# Patient Record
Sex: FEMALE | Race: WHITE | Employment: OTHER | URBAN - METROPOLITAN AREA
[De-identification: names, ages, dates, MRNs, and addresses within clinical notes are randomized per-mention and may not be internally consistent; named-entity substitution may affect disease eponyms.]

---

## 2017-01-31 ENCOUNTER — HOSPITAL ENCOUNTER (OUTPATIENT)
Dept: MAMMOGRAPHY | Age: 72
Discharge: HOME | End: 2017-01-31
Attending: INTERNAL MEDICINE
Payer: MEDICARE

## 2017-01-31 ENCOUNTER — HOSPITAL ENCOUNTER (OUTPATIENT)
Dept: ULTRASOUND IMAGING | Age: 72
Discharge: HOME | End: 2017-01-31
Attending: INTERNAL MEDICINE
Payer: MEDICARE

## 2017-01-31 DIAGNOSIS — R39.15 URINARY URGENCY: ICD-10-CM

## 2017-01-31 DIAGNOSIS — N39.0 URINARY TRACT INFECTION WITHOUT HEMATURIA, SITE UNSPECIFIED: ICD-10-CM

## 2017-01-31 DIAGNOSIS — M48.061 SPINAL STENOSIS OF LUMBAR REGION: ICD-10-CM

## 2017-01-31 DIAGNOSIS — M85.9 DISORDER OF BONE DENSITY AND STRUCTURE, UNSPECIFIED: ICD-10-CM

## 2017-01-31 DIAGNOSIS — M85.80 OSTEOPENIA: ICD-10-CM

## 2017-01-31 PROCEDURE — 77080 DXA BONE DENSITY AXIAL: CPT

## 2017-01-31 PROCEDURE — 76770 US EXAM ABDO BACK WALL COMP: CPT

## 2017-02-01 NOTE — PROGRESS NOTES
Will discuss results with you at the following upcoming appointment:   2/8/2017   2:20 PM    Orlin Posada MD          [facility]     Osteopenia on bone density.

## 2017-02-28 PROBLEM — K43.9 VENTRAL HERNIA WITHOUT OBSTRUCTION OR GANGRENE: Status: ACTIVE | Noted: 2017-02-28

## 2017-03-14 RX ORDER — CEFAZOLIN SODIUM IN 0.9 % NACL 2 G/50 ML
2 INTRAVENOUS SOLUTION, PIGGYBACK (ML) INTRAVENOUS ONCE
Status: CANCELLED | OUTPATIENT
Start: 2017-04-07 | End: 2017-04-07

## 2017-03-31 ENCOUNTER — HOSPITAL ENCOUNTER (OUTPATIENT)
Dept: SURGERY | Age: 72
Discharge: HOME | End: 2017-03-31
Payer: MEDICARE

## 2017-03-31 VITALS
RESPIRATION RATE: 20 BRPM | HEART RATE: 72 BPM | DIASTOLIC BLOOD PRESSURE: 82 MMHG | WEIGHT: 184.13 LBS | SYSTOLIC BLOOD PRESSURE: 143 MMHG | OXYGEN SATURATION: 98 % | HEIGHT: 60 IN | TEMPERATURE: 97.6 F | BODY MASS INDEX: 36.15 KG/M2

## 2017-03-31 LAB
CREAT SERPL-MCNC: 0.72 MG/DL (ref 0.6–1)
HGB BLD-MCNC: 14 G/DL (ref 11.7–15.4)
POTASSIUM SERPL-SCNC: 4 MMOL/L (ref 3.5–5.1)

## 2017-03-31 PROCEDURE — 84132 ASSAY OF SERUM POTASSIUM: CPT | Performed by: ANESTHESIOLOGY

## 2017-03-31 PROCEDURE — 82565 ASSAY OF CREATININE: CPT | Performed by: ANESTHESIOLOGY

## 2017-03-31 PROCEDURE — 85018 HEMOGLOBIN: CPT | Performed by: ANESTHESIOLOGY

## 2017-03-31 NOTE — PERIOP NOTES
Patient verified name, , and surgery as listed in [facility]. TYPE  CASE:11  Orders per surgeon: were Received  Labs per surgeon:per anesthesia:   Labs per anesthesia protocol: hgb, potassium and creat levels drawn and results are in [facility]  EKG  :  EKG from 2016 placed on chart. Patient denies any chest pain or shortness of breath on exertion      Patient provided with handouts including guide to surgery , transfusions, pain management and hand hygiene for the family and community. Pt verbalizes understanding of all pre-op instructions . Instructed that family must be present in building at all times. Hibiclens and instructions given per hospital policy. Instructed patient to continue  previous medications as prescribed prior to surgery and  to take the following medications the day of surgery according to anesthesia guidelines : lipitor,nexium, synthroid, Claritin if needed and paxil       Original medication prescription bottles were not visualized during patient appointment. Continue all previous medications unless otherwise directed. Instructed patient to hold  the following medications prior to surgery: all vitamins and supplements 7 days dprior to surgery and all nsaids 5 days prior to surgery including motrin,advil, aleve or ibuprofen      Patient verbalized understanding of all instructions and provided all medical/health information to the best of their ability.

## 2017-04-06 ENCOUNTER — ANESTHESIA EVENT (OUTPATIENT)
Dept: SURGERY | Age: 72
End: 2017-04-06
Payer: MEDICARE

## 2017-04-07 ENCOUNTER — ANESTHESIA (OUTPATIENT)
Dept: SURGERY | Age: 72
End: 2017-04-07
Payer: MEDICARE

## 2017-04-07 ENCOUNTER — HOSPITAL ENCOUNTER (OUTPATIENT)
Age: 72
Setting detail: OUTPATIENT SURGERY
Discharge: HOME | End: 2017-04-07
Attending: SURGERY | Admitting: SURGERY
Payer: MEDICARE

## 2017-04-07 ENCOUNTER — SURGERY (OUTPATIENT)
Age: 72
End: 2017-04-07

## 2017-04-07 VITALS
OXYGEN SATURATION: 94 % | RESPIRATION RATE: 11 BRPM | BODY MASS INDEX: 36.38 KG/M2 | HEART RATE: 84 BPM | DIASTOLIC BLOOD PRESSURE: 78 MMHG | HEIGHT: 60 IN | WEIGHT: 185.31 LBS | SYSTOLIC BLOOD PRESSURE: 171 MMHG | TEMPERATURE: 98.6 F

## 2017-04-07 PROCEDURE — 77030035464 HC SUT VLOC NON ABS COVD -B: Performed by: SURGERY

## 2017-04-07 PROCEDURE — 77030021158 HC TRCR BLN GELPRT AMR -B: Performed by: SURGERY

## 2017-04-07 PROCEDURE — 77030010507 HC ADH SKN DERMBND J&J -B: Performed by: SURGERY

## 2017-04-07 PROCEDURE — 77030035045 HC TRCR ENDOSC VRSPRT BLDLSS COVD -B: Performed by: SURGERY

## 2017-04-07 PROCEDURE — 77030035277 HC OBTRTR BLDELSS DISP INTU -B: Performed by: SURGERY

## 2017-04-07 PROCEDURE — 77030008522 HC TBNG INSUF LAPRO STRY -B: Performed by: SURGERY

## 2017-04-07 PROCEDURE — 77030009851 HC PCH RTVR ENDOSC AMR -B: Performed by: SURGERY

## 2017-04-07 PROCEDURE — 76210000016 HC OR PH I REC 1 TO 1.5 HR: Performed by: SURGERY

## 2017-04-07 PROCEDURE — C1781 MESH (IMPLANTABLE): HCPCS | Performed by: SURGERY

## 2017-04-07 PROCEDURE — 74011250636 HC RX REV CODE- 250/636

## 2017-04-07 PROCEDURE — 77030008703 HC TU ET UNCUF COVD -A: Performed by: ANESTHESIOLOGY

## 2017-04-07 PROCEDURE — 77030031139 HC SUT VCRL2 J&J -A: Performed by: SURGERY

## 2017-04-07 PROCEDURE — 77030020782 HC GWN BAIR PAWS FLX 3M -B: Performed by: ANESTHESIOLOGY

## 2017-04-07 PROCEDURE — 77030022704 HC SUT VLOC COVD -B: Performed by: SURGERY

## 2017-04-07 PROCEDURE — 74011250637 HC RX REV CODE- 250/637: Performed by: ANESTHESIOLOGY

## 2017-04-07 PROCEDURE — 74011000250 HC RX REV CODE- 250

## 2017-04-07 PROCEDURE — 77030021678 HC GLIDESCP STAT DISP VERT -B: Performed by: ANESTHESIOLOGY

## 2017-04-07 PROCEDURE — 76010000876 HC OR TIME 2 TO 2.5HR INTENSV - TIER 2: Performed by: SURGERY

## 2017-04-07 PROCEDURE — 74011250636 HC RX REV CODE- 250/636: Performed by: ANESTHESIOLOGY

## 2017-04-07 PROCEDURE — 77030011640 HC PAD GRND REM COVD -A: Performed by: SURGERY

## 2017-04-07 PROCEDURE — 76060000035 HC ANESTHESIA 2 TO 2.5 HR: Performed by: SURGERY

## 2017-04-07 PROCEDURE — 77030008477 HC STYL SATN SLP COVD -A: Performed by: ANESTHESIOLOGY

## 2017-04-07 PROCEDURE — 77030002933 HC SUT MCRYL J&J -A: Performed by: SURGERY

## 2017-04-07 PROCEDURE — 76210000020 HC REC RM PH II FIRST 0.5 HR: Performed by: SURGERY

## 2017-04-07 PROCEDURE — 77030032490 HC SLV COMPR SCD KNE COVD -B: Performed by: SURGERY

## 2017-04-07 PROCEDURE — 77030034744 HC WRMR SCOPE DISP STRL ADLR -A: Performed by: SURGERY

## 2017-04-07 PROCEDURE — 77030019908 HC STETH ESOPH SIMS -A: Performed by: ANESTHESIOLOGY

## 2017-04-07 PROCEDURE — 77030016151 HC PROTCTR LNS DFOG COVD -B: Performed by: SURGERY

## 2017-04-07 PROCEDURE — 74011000250 HC RX REV CODE- 250: Performed by: SURGERY

## 2017-04-07 PROCEDURE — 77030002966 HC SUT PDS J&J -A: Performed by: SURGERY

## 2017-04-07 PROCEDURE — 74011250636 HC RX REV CODE- 250/636: Performed by: SURGERY

## 2017-04-07 PROCEDURE — 77030034849: Performed by: SURGERY

## 2017-04-07 DEVICE — IMPLANTABLE DEVICE: Type: IMPLANTABLE DEVICE | Site: ABDOMEN | Status: FUNCTIONAL

## 2017-04-07 RX ORDER — HYDROMORPHONE HYDROCHLORIDE 2 MG/ML
0.5 INJECTION, SOLUTION INTRAMUSCULAR; INTRAVENOUS; SUBCUTANEOUS
Status: DISCONTINUED | OUTPATIENT
Start: 2017-04-07 | End: 2017-04-07 | Stop reason: HOSPADM

## 2017-04-07 RX ORDER — GLYCOPYRROLATE 0.2 MG/ML
INJECTION INTRAMUSCULAR; INTRAVENOUS AS NEEDED
Status: DISCONTINUED | OUTPATIENT
Start: 2017-04-07 | End: 2017-04-07 | Stop reason: HOSPADM

## 2017-04-07 RX ORDER — CEFAZOLIN SODIUM IN 0.9 % NACL 2 G/50 ML
2 INTRAVENOUS SOLUTION, PIGGYBACK (ML) INTRAVENOUS ONCE
Status: COMPLETED | OUTPATIENT
Start: 2017-04-07 | End: 2017-04-07

## 2017-04-07 RX ORDER — SODIUM CHLORIDE 0.9 % (FLUSH) 0.9 %
5-10 SYRINGE (ML) INJECTION AS NEEDED
Status: DISCONTINUED | OUTPATIENT
Start: 2017-04-07 | End: 2017-04-07 | Stop reason: HOSPADM

## 2017-04-07 RX ORDER — FENTANYL CITRATE 50 UG/ML
INJECTION, SOLUTION INTRAMUSCULAR; INTRAVENOUS AS NEEDED
Status: DISCONTINUED | OUTPATIENT
Start: 2017-04-07 | End: 2017-04-07 | Stop reason: HOSPADM

## 2017-04-07 RX ORDER — HYDRALAZINE HYDROCHLORIDE 20 MG/ML
INJECTION INTRAMUSCULAR; INTRAVENOUS AS NEEDED
Status: DISCONTINUED | OUTPATIENT
Start: 2017-04-07 | End: 2017-04-07 | Stop reason: HOSPADM

## 2017-04-07 RX ORDER — SODIUM CHLORIDE, SODIUM LACTATE, POTASSIUM CHLORIDE, CALCIUM CHLORIDE 600; 310; 30; 20 MG/100ML; MG/100ML; MG/100ML; MG/100ML
75 INJECTION, SOLUTION INTRAVENOUS CONTINUOUS
Status: DISCONTINUED | OUTPATIENT
Start: 2017-04-07 | End: 2017-04-07 | Stop reason: HOSPADM

## 2017-04-07 RX ORDER — CEFAZOLIN SODIUM IN 0.9 % NACL 2 G/50 ML
2 INTRAVENOUS SOLUTION, PIGGYBACK (ML) INTRAVENOUS ONCE
Status: DISCONTINUED | OUTPATIENT
Start: 2017-04-07 | End: 2017-04-07 | Stop reason: SDUPTHER

## 2017-04-07 RX ORDER — NALOXONE HYDROCHLORIDE 0.4 MG/ML
0.2 INJECTION, SOLUTION INTRAMUSCULAR; INTRAVENOUS; SUBCUTANEOUS AS NEEDED
Status: DISCONTINUED | OUTPATIENT
Start: 2017-04-07 | End: 2017-04-07 | Stop reason: HOSPADM

## 2017-04-07 RX ORDER — LIDOCAINE HYDROCHLORIDE 10 MG/ML
0.1 INJECTION INFILTRATION; PERINEURAL AS NEEDED
Status: DISCONTINUED | OUTPATIENT
Start: 2017-04-07 | End: 2017-04-07 | Stop reason: HOSPADM

## 2017-04-07 RX ORDER — LIDOCAINE HYDROCHLORIDE 20 MG/ML
INJECTION, SOLUTION EPIDURAL; INFILTRATION; INTRACAUDAL; PERINEURAL AS NEEDED
Status: DISCONTINUED | OUTPATIENT
Start: 2017-04-07 | End: 2017-04-07 | Stop reason: HOSPADM

## 2017-04-07 RX ORDER — DEXAMETHASONE SODIUM PHOSPHATE 4 MG/ML
INJECTION, SOLUTION INTRA-ARTICULAR; INTRALESIONAL; INTRAMUSCULAR; INTRAVENOUS; SOFT TISSUE AS NEEDED
Status: DISCONTINUED | OUTPATIENT
Start: 2017-04-07 | End: 2017-04-07 | Stop reason: HOSPADM

## 2017-04-07 RX ORDER — MIDAZOLAM HYDROCHLORIDE 1 MG/ML
2 INJECTION, SOLUTION INTRAMUSCULAR; INTRAVENOUS
Status: DISCONTINUED | OUTPATIENT
Start: 2017-04-07 | End: 2017-04-07 | Stop reason: HOSPADM

## 2017-04-07 RX ORDER — ROCURONIUM BROMIDE 10 MG/ML
INJECTION, SOLUTION INTRAVENOUS AS NEEDED
Status: DISCONTINUED | OUTPATIENT
Start: 2017-04-07 | End: 2017-04-07 | Stop reason: HOSPADM

## 2017-04-07 RX ORDER — ONDANSETRON 2 MG/ML
INJECTION INTRAMUSCULAR; INTRAVENOUS AS NEEDED
Status: DISCONTINUED | OUTPATIENT
Start: 2017-04-07 | End: 2017-04-07 | Stop reason: HOSPADM

## 2017-04-07 RX ORDER — PROPOFOL 10 MG/ML
INJECTION, EMULSION INTRAVENOUS AS NEEDED
Status: DISCONTINUED | OUTPATIENT
Start: 2017-04-07 | End: 2017-04-07 | Stop reason: HOSPADM

## 2017-04-07 RX ORDER — HYDROMORPHONE HYDROCHLORIDE 2 MG/ML
INJECTION, SOLUTION INTRAMUSCULAR; INTRAVENOUS; SUBCUTANEOUS AS NEEDED
Status: DISCONTINUED | OUTPATIENT
Start: 2017-04-07 | End: 2017-04-07 | Stop reason: HOSPADM

## 2017-04-07 RX ORDER — OXYCODONE AND ACETAMINOPHEN 5; 325 MG/1; MG/1
1 TABLET ORAL AS NEEDED
Status: DISCONTINUED | OUTPATIENT
Start: 2017-04-07 | End: 2017-04-07 | Stop reason: HOSPADM

## 2017-04-07 RX ORDER — OXYCODONE AND ACETAMINOPHEN 7.5; 325 MG/1; MG/1
1 TABLET ORAL
Qty: 25 TAB | Refills: 0 | Status: SHIPPED | OUTPATIENT
Start: 2017-04-07 | End: 2017-04-27

## 2017-04-07 RX ORDER — FAMOTIDINE 20 MG/1
20 TABLET, FILM COATED ORAL ONCE
Status: COMPLETED | OUTPATIENT
Start: 2017-04-07 | End: 2017-04-07

## 2017-04-07 RX ORDER — NEOSTIGMINE METHYLSULFATE 1 MG/ML
INJECTION INTRAVENOUS AS NEEDED
Status: DISCONTINUED | OUTPATIENT
Start: 2017-04-07 | End: 2017-04-07 | Stop reason: HOSPADM

## 2017-04-07 RX ORDER — BUPIVACAINE HYDROCHLORIDE AND EPINEPHRINE 5; 5 MG/ML; UG/ML
INJECTION, SOLUTION EPIDURAL; INTRACAUDAL; PERINEURAL AS NEEDED
Status: DISCONTINUED | OUTPATIENT
Start: 2017-04-07 | End: 2017-04-07 | Stop reason: HOSPADM

## 2017-04-07 RX ADMIN — HYDROMORPHONE HYDROCHLORIDE 0.2 MG: 2 INJECTION, SOLUTION INTRAMUSCULAR; INTRAVENOUS; SUBCUTANEOUS at 10:18

## 2017-04-07 RX ADMIN — CEFAZOLIN 2 G: 1 INJECTION, POWDER, FOR SOLUTION INTRAMUSCULAR; INTRAVENOUS; PARENTERAL at 08:30

## 2017-04-07 RX ADMIN — NEOSTIGMINE METHYLSULFATE 3 MG: 1 INJECTION INTRAVENOUS at 10:07

## 2017-04-07 RX ADMIN — SODIUM CHLORIDE, SODIUM LACTATE, POTASSIUM CHLORIDE, AND CALCIUM CHLORIDE: 600; 310; 30; 20 INJECTION, SOLUTION INTRAVENOUS at 09:00

## 2017-04-07 RX ADMIN — HYDROMORPHONE HYDROCHLORIDE 0.2 MG: 2 INJECTION, SOLUTION INTRAMUSCULAR; INTRAVENOUS; SUBCUTANEOUS at 10:22

## 2017-04-07 RX ADMIN — OXYCODONE HYDROCHLORIDE AND ACETAMINOPHEN 1 TABLET: 5; 325 TABLET ORAL at 10:53

## 2017-04-07 RX ADMIN — FENTANYL CITRATE 25 MCG: 50 INJECTION, SOLUTION INTRAMUSCULAR; INTRAVENOUS at 09:53

## 2017-04-07 RX ADMIN — FENTANYL CITRATE 25 MCG: 50 INJECTION, SOLUTION INTRAMUSCULAR; INTRAVENOUS at 09:33

## 2017-04-07 RX ADMIN — ONDANSETRON 4 MG: 2 INJECTION INTRAMUSCULAR; INTRAVENOUS at 10:02

## 2017-04-07 RX ADMIN — BUPIVACAINE HYDROCHLORIDE AND EPINEPHRINE BITARTRATE 30 ML: 5; .005 INJECTION, SOLUTION EPIDURAL; INTRACAUDAL; PERINEURAL at 10:11

## 2017-04-07 RX ADMIN — HYDRALAZINE HYDROCHLORIDE 5 MG: 20 INJECTION INTRAMUSCULAR; INTRAVENOUS at 10:11

## 2017-04-07 RX ADMIN — SODIUM CHLORIDE, SODIUM LACTATE, POTASSIUM CHLORIDE, AND CALCIUM CHLORIDE 75 ML/HR: 600; 310; 30; 20 INJECTION, SOLUTION INTRAVENOUS at 07:50

## 2017-04-07 RX ADMIN — HYDRALAZINE HYDROCHLORIDE 5 MG: 20 INJECTION INTRAMUSCULAR; INTRAVENOUS at 10:16

## 2017-04-07 RX ADMIN — FAMOTIDINE 20 MG: 20 TABLET ORAL at 07:50

## 2017-04-07 RX ADMIN — PROPOFOL 200 MG: 10 INJECTION, EMULSION INTRAVENOUS at 08:24

## 2017-04-07 RX ADMIN — DEXAMETHASONE SODIUM PHOSPHATE 8 MG: 4 INJECTION, SOLUTION INTRA-ARTICULAR; INTRALESIONAL; INTRAMUSCULAR; INTRAVENOUS; SOFT TISSUE at 08:34

## 2017-04-07 RX ADMIN — LIDOCAINE HYDROCHLORIDE 70 MG: 20 INJECTION, SOLUTION EPIDURAL; INFILTRATION; INTRACAUDAL; PERINEURAL at 08:23

## 2017-04-07 RX ADMIN — FENTANYL CITRATE 50 MCG: 50 INJECTION, SOLUTION INTRAMUSCULAR; INTRAVENOUS at 09:04

## 2017-04-07 RX ADMIN — PROPOFOL 40 MG: 10 INJECTION, EMULSION INTRAVENOUS at 10:23

## 2017-04-07 RX ADMIN — GLYCOPYRROLATE 0.3 MG: 0.2 INJECTION INTRAMUSCULAR; INTRAVENOUS at 10:07

## 2017-04-07 RX ADMIN — HYDROMORPHONE HYDROCHLORIDE 0.4 MG: 2 INJECTION, SOLUTION INTRAMUSCULAR; INTRAVENOUS; SUBCUTANEOUS at 10:15

## 2017-04-07 RX ADMIN — FENTANYL CITRATE 100 MCG: 50 INJECTION, SOLUTION INTRAMUSCULAR; INTRAVENOUS at 08:23

## 2017-04-07 RX ADMIN — MIDAZOLAM HYDROCHLORIDE 2 MG: 1 INJECTION, SOLUTION INTRAMUSCULAR; INTRAVENOUS at 08:02

## 2017-04-07 RX ADMIN — ROCURONIUM BROMIDE 50 MG: 10 INJECTION, SOLUTION INTRAVENOUS at 08:24

## 2017-04-07 NOTE — ANESTHESIA POSTPROCEDURE EVALUATION
Post-Anesthesia Evaluation and Assessment    Patient: Amelia Hargrove MRN: [id number]  SSN: [id number]    YOB: 1945  Age: 73 y.o. Sex: female       Cardiovascular Function/Vital Signs  Visit Vitals    /78    Pulse 84    Temp 37 °C (98.6 °F)    Resp 11    Ht 5' (1.524 m)    Wt 84.1 kg (185 lb 5 oz)    SpO2 94%    BMI 36.19 kg/m2       Patient is status post general anesthesia for Procedure(s): HERNIA VENTRAL REPAIR ROBOTIC ASSISTED WITH MESH. Nausea/Vomiting: None    Postoperative hydration reviewed and adequate. Pain:  Pain Scale 1: Numeric (0 - 10) (04/07/17 1116)  Pain Intensity 1: 3 (04/07/17 1116)   Managed    Neurological Status:   Neuro (WDL): Within Defined Limits (04/07/17 1047)   At baseline    Mental Status and Level of Consciousness: Arousable    Pulmonary Status:   O2 Device: Room air (04/07/17 1106)   Adequate oxygenation and airway patent    Complications related to anesthesia: None    Post-anesthesia assessment completed.  No concerns    Signed By: Stuart Eddy MD     April 7, 2017

## 2017-04-07 NOTE — H&P
Surgery History and Physical    Subjective:      Isadora Alas is a 73 y.o. female who presents for a robotic assisted ventral hernia repair. Past Medical History:   Diagnosis Date    Anxiety 2016    Cancer [facility]) Dx     right breast cancer    Fatty liver 2016    GERD (gastroesophageal reflux disease)     H/O seasonal allergies     History of breast cancer 007    s/p bilateral mastectomies    Hyperlipidemia     Hypertension     managed with med    Osteoarthritis     knees and back    Spinal stenosis     Thyroid disease     hypo     Past Surgical History:   Procedure Laterality Date    HX  SECTION      HX GYN Bilateral     bilateral oopherectomy - uterus remains    HX KNEE REPLACEMENT Bilateral     , ,  - right on 2 occasions    HX MASTECTOMY Bilateral     and flap reconstruction- complications due to wound of the groin/lower abdomen due to pseudomonas    HX WRIST FRACTURE TX Right     hardware placed then removed      Family History   Problem Relation Age of Onset    Hypertension Mother     Hypertension Father     Diabetes Father     Stroke Father     Heart Disease Father      masive MI    Other Sister      thrombocytopenia    Seizures Sister     Ulcerative Colitis Sister      Social History   Substance Use Topics    Smoking status: Former Smoker     Packs/day: 0.75     Years: 20.00     Quit date: 2000    Smokeless tobacco: Never Used    Alcohol use Yes      Comment: occasionaly      Prior to Admission medications    Medication Sig Start Date End Date Taking? Authorizing Provider   omega-3 fatty acids-vitamin e 1,000 mg cap Take  by mouth. Yes Historical Provider   esomeprazole (NEXIUM) 40 mg capsule Take 1 Cap by mouth daily. 17  Yes Oliverio Dorman MD   atorvastatin (LIPITOR) 10 mg tablet Take 1 Tab by mouth daily. 17  Yes Oliverio Dorman MD   cholecalciferol, VITAMIN D3, (VITAMIN D3) 5,000 unit tab tablet Take  by mouth daily. Yes Historical Provider   loratadine (CLARITIN) 10 mg tablet Take 10 mg by mouth daily as needed. Yes Historical Provider   levothyroxine (SYNTHROID) 75 mcg tablet Take 1 Tab by mouth Daily (before breakfast). 16  Yes Shayan Myrick MD   lisinopril-hydroCHLOROthiazide (PRINZIDE, ZESTORETIC) 20-25 mg per tablet Take 1 Tab by mouth daily. 16  Yes Shayan Myrick MD   PARoxetine (PAXIL) 40 mg tablet Take 1 Tab by mouth daily. 16  Yes Shayan Myrick MD   [facility] cmb #3-fos-pantethine (PROBIOTIC AND ACIDOPHILUS) 300-250 million cell-mg cap Take daily to help with bowel health   Yes Shayan Myrick MD      No Known Allergies    Review of Systems   All other systems reviewed and are negative. Objective:     Patient Vitals for the past 8 hrs:   BP Temp Pulse Resp SpO2 Height Weight   17 0646 (!) 166/97 97.5 °F (36.4 °C) 60 18 96 % 5' (1.524 m) 185 lb 5 oz (84.1 kg)       Temp (24hrs), Av.5 °F (36.4 °C), Min:97.5 °F (36.4 °C), Max:97.5 °F (36.4 °C)      Physical Exam   Constitutional: She is oriented to person, place, and time. She appears well-developed and well-nourished. No distress. HENT:   Head: Normocephalic and atraumatic. Eyes: Pupils are equal, round, and reactive to light. Neck: Normal range of motion. Neck supple. Cardiovascular: Normal rate, regular rhythm and normal heart sounds. Pulmonary/Chest: Effort normal and breath sounds normal. No respiratory distress. She has no wheezes. Abdominal: Soft. Bowel sounds are normal.   +Ventral hernia   Musculoskeletal: Normal range of motion. Neurological: She is alert and oriented to person, place, and time. Skin: Skin is warm and dry. She is not diaphoretic. Psychiatric: Her behavior is normal. Judgment and thought content normal.       Assessment:     Ventral hernia    Plan:     Discussed the risk of surgery including but not limited to bleeding,infection,recurrence,  and the risks of general anesthetic.  The patient understands the risks; any and all questions were answered to the patient's satisfaction.     Signed By: Jori Huber MD     April 7, 2017

## 2017-04-07 NOTE — ANESTHESIA PREPROCEDURE EVALUATION
Anesthetic History   No history of anesthetic complications            Review of Systems / Medical History  Patient summary reviewed, nursing notes reviewed and pertinent labs reviewed    Pulmonary  Within defined limits                 Neuro/Psych   Within defined limits           Cardiovascular    Hypertension: well controlled          Hyperlipidemia    Exercise tolerance: >4 METS     GI/Hepatic/Renal     GERD: well controlled           Endo/Other      Hypothyroidism: well controlled  Arthritis     Other Findings              Physical Exam    Airway  Mallampati: II  TM Distance: 4 - 6 cm  Neck ROM: normal range of motion   Mouth opening: Normal     Cardiovascular    Rhythm: regular           Dental  No notable dental hx       Pulmonary  Breath sounds clear to auscultation               Abdominal  GI exam deferred       Other Findings            Anesthetic Plan    ASA: 2  Anesthesia type: general          Induction: Intravenous  Anesthetic plan and risks discussed with: Patient

## 2017-04-07 NOTE — OP NOTES
[facility] 67   OPERATIVE REPORT       Name:  Meka Carmona   MR#:  [id number]   :  1945   Account #:  [de-identified]   Date of Adm:  2017       DATE OF PROCEDURE: 2017     PREOPERATIVE DIAGNOSIS: Ventral hernia. POSTOPERATIVE DIAGNOSIS: Ventral hernia. PROCEDURE: Robotic ventral hernia repair with mesh. SURGEON: Mahogany Aaron. Selena Concepcion MD.    ANESTHESIA: General.    COMPLICATIONS: None. COUNTS: Correct. ESTIMATED BLOOD LOSS: Minimal.    SPECIMEN: None. PROCEDURE: After the patient was asleep, the abdomen was prepped   and draped in sterile fashion. A time-out was carried out and   all were in agreement. A left lateral mid quadrant incision made, dissection carried   down to the fascia, opened, and directed entry to the abdominal   cavity gained. A 10 mm camera port was then inserted, balloon   blown up, and then the robotic scope inserted. The patient had   about a 4 x 4 cm round ventral hernia superiorly. There were no   contents of this. At this point, a 12 mm was then placed 10 cm   inferiorly and slightly more toward the midline. In the right   upper quadrant, an 8 mm robotic trocar port placed 10 cm and   slightly more medial from the camera port. Robot was then   brought in and docked. I then grasped the sac and this was   dissected free from the defect completely and then stored in the   abdominal cavity inferiorly for removal later. I then used a #1   V-Loc permanent suture to close the defect in a running fashion. Once this was done, I used a 7.6 x 7.6 Ventrio mesh. This was   inserted. A needle was inserted through the abdominal wall   through the mid portion and through the mesh in order to put the   suture passer and this was used to pull this up and hold it in   place. This was tied down. This was made through a very small   nick in the skin with an 11 mm blade. This suture was then cut.    A running V-Loc was then utilized to secure this circumferentially around the entire mesh. At this point, all   sutures removed. Endobag placed and this peritoneal sac was then   placed in the EndoCatch bag and removed. All trocars removed,   pneumoperitoneum released, 4-0 subcuticular Monocryl and   Dermabond was used to close the 3 incisions. #1 PDS was used to   close the fascia at the 12 mm trocar site and the 10 mm trocar   site. The patient tolerated the procedure well. ALDO Alcaraz MD      [facility] / [address] [place] [address]   D:  04/07/2017   10:19   T:  04/07/2017   10:59   Job #:  [id number]

## 2017-04-07 NOTE — BRIEF OP NOTE
BRIEF OPERATIVE NOTE    Date of Procedure: 4/7/2017   Preoperative Diagnosis: Ventral hernia without obstruction or gangrene [K43.9]  Postoperative Diagnosis: Ventral hernia without obstruction or gangrene [K43.9]    Procedure(s): HERNIA VENTRAL REPAIR ROBOTIC ASSISTED WITH MESH  Surgeon(s) and Role: Yanely Collado MD - Primary            Surgical Staff:  Circ-1: Jessica Sepulveda RN  Scrub Tech-1: Pia Yuan  Scrub Tech-2: Rajesh Lei  Event Time In   Incision Start 4313   Incision Close      Anesthesia: General   Estimated Blood Loss: minimal  Specimens: * No specimens in log *   Findings: see op note   Complications: none  Implants:   Implant Name Type Inv.  Item Serial No.  Lot No. LRB No. Used Action   MESH VENTRIO SM CIRC WI AB RG --  - [id number]   MESH VENTRIO SM CIRC WI AB RG --    BARD DASHAWN [id number] N/A 1 Implanted

## 2017-04-07 NOTE — IP AVS SNAPSHOT
Current Discharge Medication List  
  
START taking these medications Dose & Instructions Dispensing Information Comments Morning Noon Evening Bedtime  
 oxyCODONE-acetaminophen 7.5-325 mg per tablet Commonly known as:  PERCOCET Your last dose was: Your next dose is:    
   
   
 Dose:  1 Tab Take 1 Tab by mouth every four (4) hours as needed for Pain. Max Daily Amount: 6 Tabs. Quantity:  25 Tab Refills:  0 CONTINUE these medications which have NOT CHANGED Dose & Instructions Dispensing Information Comments Morning Noon Evening Bedtime  
 atorvastatin 10 mg tablet Commonly known as:  LIPITOR Your last dose was: Your next dose is:    
   
   
 Dose:  10 mg Take 1 Tab by mouth daily. Quantity:  90 Tab Refills:  1 cholecalciferol (VITAMIN D3) 5,000 unit Tab tablet Commonly known as:  VITAMIN D3 Your last dose was: Your next dose is: Take  by mouth daily. Refills:  0  
     
   
   
   
  
 esomeprazole 40 mg capsule Commonly known as:  NexIUM Your last dose was: Your next dose is:    
   
   
 Dose:  40 mg Take 1 Cap by mouth daily. Quantity:  90 Cap Refills:  1  
     
   
   
   
  
 levothyroxine 75 mcg tablet Commonly known as:  SYNTHROID Your last dose was: Your next dose is:    
   
   
 Dose:  75 mcg Take 1 Tab by mouth Daily (before breakfast). Quantity:  90 Tab Refills:  1  
     
   
   
   
  
 lisinopril-hydroCHLOROthiazide 20-25 mg per tablet Commonly known as:  Eligio Flurry Your last dose was: Your next dose is:    
   
   
 Dose:  1 Tab Take 1 Tab by mouth daily. Quantity:  90 Tab Refills:  1  
     
   
   
   
  
 loratadine 10 mg tablet Commonly known as:  Fidel Dario Your last dose was:     
   
Your next dose is:    
   
   
 Dose:  10 mg  
 Take 10 mg by mouth daily as needed. Refills:  0  
     
   
   
   
  
 omega-3 fatty acids-vitamin e 1,000 mg Cap Your last dose was: Your next dose is: Take  by mouth. Refills:  0 PARoxetine 40 mg tablet Commonly known as:  PAXIL Your last dose was: Your next dose is:    
   
   
 Dose:  40 mg Take 1 Tab by mouth daily. Quantity:  90 Tab Refills:  1 PROBIOTIC AND ACIDOPHILUS 300-250 million cell-mg Cap Generic drug:  lactobac cmb #3-fos-pantethine Your last dose was: Your next dose is: Take daily to help with bowel health Refills:  0 Where to Get Your Medications Information on where to get these meds will be given to you by the nurse or doctor. ! Ask your nurse or doctor about these medications  
  oxyCODONE-acetaminophen 7.5-325 mg per tablet

## 2017-04-07 NOTE — IP AVS SNAPSHOT
[address] [address] 
[phone] Patient: Chacorta Bain MRN: [id number] :1945 You are allergic to the following No active allergies Recent Documentation Height Weight BMI OB Status Smoking Status 1.524 m 84.1 kg 36.19 kg/m2 Postmenopausal Former Smoker Emergency Contacts Name Discharge Info Relation Home Work Mobile [facility] DISCHARGE CAREGIVER [3] Other Relative [6] [phone] About your hospitalization You were admitted on:  2017 You last received care in the:  [facility] PACU You were discharged on:  2017 Unit phone number:  [phone] Why you were hospitalized Your primary diagnosis was:  Not on File Providers Seen During Your Hospitalizations Provider Role Specialty Primary office phone Rowan Kuo MD Attending Provider General Surgery [phone] Your Primary Care Physician (PCP) Primary Care Physician Office Phone Office Fax Paolo Talbert [phone] Follow-up Information Follow up With Details Comments Contact Info Kash Pope MD   [address] [place] [address],[address] 
[phone] Rowan Kuo MD Follow up in 10 day(s) follow up [address] 
[place] [address] [address] Surgical [place] [address] 
[phone] Your Appointments 2017  1:15 PM EDT Global Post Op with Rowan Kuo MD  
[place] [facility] ([facility] MAIN) [address] [place] [address]  
[phone] Thursday May 04, 2017 10:00 AM EDT  
LAB with [facility] [address] [facility]) [facility] [address] [phone] Tuesday May 16, 2017 10:30 AM EDT Medicare Wellness with Kash Pope MD, [facility] Uri Styles [address] [place] [facility] [address] [address] [phone] Current Discharge Medication List  
  
START taking these medications Dose & Instructions Dispensing Information Comments Morning Noon Evening Bedtime  
 oxyCODONE-acetaminophen 7.5-325 mg per tablet Commonly known as:  PERCOCET Your last dose was: Your next dose is:    
   
   
 Dose:  1 Tab Take 1 Tab by mouth every four (4) hours as needed for Pain. Max Daily Amount: 6 Tabs. Quantity:  25 Tab Refills:  0 CONTINUE these medications which have NOT CHANGED Dose & Instructions Dispensing Information Comments Morning Noon Evening Bedtime  
 atorvastatin 10 mg tablet Commonly known as:  LIPITOR Your last dose was: Your next dose is:    
   
   
 Dose:  10 mg Take 1 Tab by mouth daily. Quantity:  90 Tab Refills:  1 cholecalciferol (VITAMIN D3) 5,000 unit Tab tablet Commonly known as:  VITAMIN D3 Your last dose was: Your next dose is: Take  by mouth daily. Refills:  0  
     
   
   
   
  
 esomeprazole 40 mg capsule Commonly known as:  NexIUM Your last dose was: Your next dose is:    
   
   
 Dose:  40 mg Take 1 Cap by mouth daily. Quantity:  90 Cap Refills:  1  
     
   
   
   
  
 levothyroxine 75 mcg tablet Commonly known as:  SYNTHROID Your last dose was: Your next dose is:    
   
   
 Dose:  75 mcg Take 1 Tab by mouth Daily (before breakfast). Quantity:  90 Tab Refills:  1  
     
   
   
   
  
 lisinopril-hydroCHLOROthiazide 20-25 mg per tablet Commonly known as:  Yoly Nation Your last dose was: Your next dose is:    
   
   
 Dose:  1 Tab Take 1 Tab by mouth daily. Quantity:  90 Tab Refills:  1  
     
   
   
   
  
 loratadine 10 mg tablet Commonly known as:  Rahel Silva Your last dose was: Your next dose is:    
   
   
 Dose:  10 mg Take 10 mg by mouth daily as needed. Refills:  0  
     
   
   
   
  
 omega-3 fatty acids-vitamin e 1,000 mg Cap Your last dose was: Your next dose is: Take  by mouth. Refills:  0 PARoxetine 40 mg tablet Commonly known as:  PAXIL Your last dose was: Your next dose is:    
   
   
 Dose:  40 mg Take 1 Tab by mouth daily. Quantity:  90 Tab Refills:  1 PROBIOTIC AND ACIDOPHILUS 300-250 million cell-mg Cap Generic drug:  lactobac cmb #3-fos-pantethine Your last dose was: Your next dose is: Take daily to help with bowel health Refills:  0 Where to Get Your Medications Information on where to get these meds will be given to you by the nurse or doctor. ! Ask your nurse or doctor about these medications  
  oxyCODONE-acetaminophen 7.5-325 mg per tablet Discharge Instructions HERNIA REPAIR 
APPOINTMENT-4/19/2017 AT 1:15 WITH DR. RUIZ 
ACTIVITY · As tolerated and as directed by your doctor. · Bathe and shower as directed by your doctor. · Avoid lifting more than 15 pounds (pulling and straining). Avoid excessive use of stairs. · Take deep breathes and support incision with pillow when you cough. DIET · Clear liquids until no nausea or vomiting; then light diet for the first day. · Advance to regular diet on second day, unless directed by your doctor. · If nausea or vomiting continues, call your doctor. DRESSING CARE-keep dry CALL YOUR DOCTOR IF  
· Excessive bleeding that does not stop after holding pressure over the area. · Temperature of 101 degrees F or above. · Redness, excessive swelling or bruising, and/ or green or yellow, smelly discharge from incision. AFTER ANESTHESIA · For the first 24 hours: DO NOT drive, drink alcoholic beverages, or make important decisions. · Be aware of dizziness following anesthesia and while taking pain medicatio YOUR DOCTOR'S PHONE NUMBER [phone] DISCHARGE SUMMARY from Nurse PATIENT INSTRUCTIONS: 
 
After general anesthesia or intravenous sedation, for 24 hours or while taking prescription Narcotics: · Limit your activities · Do not drive and operate hazardous machinery · Do not make important personal or business decisions · Do  not drink alcoholic beverages · If you have not urinated within 8 hours after discharge, please contact your surgeon on call. *  Please give a list of your current medications to your Primary Care Provider. *  Please update this list whenever your medications are discontinued, doses are 
    changed, or new medications (including over-the-counter products) are added. *  Please carry medication information at all times in case of emergency situations. These are general instructions for a healthy lifestyle: No smoking/ No tobacco products/ Avoid exposure to second hand smoke Surgeon General's Warning:  Quitting smoking now greatly reduces serious risk to your health. Obesity, smoking, and sedentary lifestyle greatly increases your risk for illness A healthy diet, regular physical exercise & weight monitoring are important for maintaining a healthy lifestyle You may be retaining fluid if you have a history of heart failure or if you experience any of the following symptoms:  Weight gain of 3 pounds or more overnight or 5 pounds in a week, increased swelling in our hands or feet or shortness of breath while lying flat in bed. Please call your doctor as soon as you notice any of these symptoms; do not wait until your next office visit. Recognize signs and symptoms of STROKE: 
 
F-face looks uneven A-arms unable to move or move unevenly S-speech slurred or non-existent T-time-call 911 as soon as signs and symptoms begin-DO NOT go Back to bed or wait to see if you get better-TIME IS BRAIN. Discharge Orders None Introducing [facility]! Dear Carley Forrester: Thank you for requesting a [employer] account. Our records indicate that you already have an active [employer] account. You can access your account anytime at [email or web address] Did you know that you can access your hospital and ER discharge instructions at any time in [employer]? You can also review all of your test results from your hospital stay or ER visit. Additional Information If you have questions, please visit the Frequently Asked Questions section of the [employer] website at [email or web address] Remember, [employer] is NOT to be used for urgent needs. For medical emergencies, dial 911. Now available from your iPhone and Android! General Information Please provide this summary of care documentation to your next provider. Patient Signature:  ____________________________________________________________ Date:  ____________________________________________________________  
  
Skip Cons Provider Signature:  ____________________________________________________________ Date:  ____________________________________________________________

## 2017-04-07 NOTE — DISCHARGE INSTRUCTIONS
HERNIA REPAIR  APPOINTMENT-4/19/2017 AT 1:15 WITH DR. RUIZ  ACTIVITY  · As tolerated and as directed by your doctor. · Bathe and shower as directed by your doctor. · Avoid lifting more than 15 pounds (pulling and straining). Avoid excessive use of stairs. · Take deep breathes and support incision with pillow when you cough. DIET  · Clear liquids until no nausea or vomiting; then light diet for the first day. · Advance to regular diet on second day, unless directed by your doctor. · If nausea or vomiting continues, call your doctor. DRESSING CARE-keep dry    CALL YOUR DOCTOR IF   · Excessive bleeding that does not stop after holding pressure over the area. · Temperature of 101 degrees F or above. · Redness, excessive swelling or bruising, and/ or green or yellow, smelly discharge from incision. AFTER ANESTHESIA  · For the first 24 hours: DO NOT drive, drink alcoholic beverages, or make important decisions. · Be aware of dizziness following anesthesia and while taking pain medicatio    YOUR DOCTOR'S PHONE NUMBER [phone]    DISCHARGE SUMMARY from Nurse    PATIENT INSTRUCTIONS:    After general anesthesia or intravenous sedation, for 24 hours or while taking prescription Narcotics:  · Limit your activities  · Do not drive and operate hazardous machinery  · Do not make important personal or business decisions  · Do  not drink alcoholic beverages  · If you have not urinated within 8 hours after discharge, please contact your surgeon on call. *  Please give a list of your current medications to your Primary Care Provider. *  Please update this list whenever your medications are discontinued, doses are      changed, or new medications (including over-the-counter products) are added. *  Please carry medication information at all times in case of emergency situations.       These are general instructions for a healthy lifestyle:    No smoking/ No tobacco products/ Avoid exposure to second hand smoke    Surgeon General's Warning:  Quitting smoking now greatly reduces serious risk to your health. Obesity, smoking, and sedentary lifestyle greatly increases your risk for illness    A healthy diet, regular physical exercise & weight monitoring are important for maintaining a healthy lifestyle    You may be retaining fluid if you have a history of heart failure or if you experience any of the following symptoms:  Weight gain of 3 pounds or more overnight or 5 pounds in a week, increased swelling in our hands or feet or shortness of breath while lying flat in bed. Please call your doctor as soon as you notice any of these symptoms; do not wait until your next office visit. Recognize signs and symptoms of STROKE:    F-face looks uneven    A-arms unable to move or move unevenly    S-speech slurred or non-existent    T-time-call 911 as soon as signs and symptoms begin-DO NOT go       Back to bed or wait to see if you get better-TIME IS BRAIN.

## 2017-09-28 PROBLEM — R73.09 ELEVATED GLUCOSE: Status: ACTIVE | Noted: 2017-09-28

## 2017-09-28 PROBLEM — E03.9 HYPOTHYROIDISM: Status: ACTIVE | Noted: 2017-09-28

## 2017-11-30 ENCOUNTER — HOSPITAL ENCOUNTER (OUTPATIENT)
Dept: LAB | Age: 72
Discharge: HOME | End: 2017-11-30

## 2017-11-30 PROCEDURE — 88312 SPECIAL STAINS GROUP 1: CPT | Performed by: INTERNAL MEDICINE

## 2017-11-30 PROCEDURE — 88305 TISSUE EXAM BY PATHOLOGIST: CPT | Performed by: INTERNAL MEDICINE

## 2017-12-11 PROBLEM — K29.60 EROSIVE GASTRITIS: Status: ACTIVE | Noted: 2017-11-30

## 2017-12-11 PROBLEM — K29.80 DUODENITIS: Status: ACTIVE | Noted: 2017-11-30

## 2018-04-02 PROBLEM — F41.9 ANXIETY AND DEPRESSION: Status: ACTIVE | Noted: 2018-04-02

## 2018-04-02 PROBLEM — F32.A ANXIETY AND DEPRESSION: Status: ACTIVE | Noted: 2018-04-02

## 2018-09-14 PROBLEM — R30.0 DYSURIA: Status: ACTIVE | Noted: 2018-09-14

## 2018-10-18 PROBLEM — E66.9 OBESITY (BMI 30-39.9): Status: ACTIVE | Noted: 2018-10-18

## 2019-06-28 NOTE — PROGRESS NOTES
Carina Herrera presents today for a reading of her Mantoux Tuberculin Skin Test.    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  Your TB screening questionnaire was asymptomatic.  Requisition given to you have a Chest X-ray to verify that you do not have active disease on your lungs.  Patient instructed that Chest X-ray good for 5 years and that he should never get another TB skin test.  Once positive, always positive.  Present Chest X-ray for future requests for TB skin tests.    See lab tab for result.     Signed: Lexie Newman CNP     Overall your test result are quite good. I do not recommend any treatment changes based on these results.   Will discuss results with you at the following upcoming appointment:   2/8/2017   2:20 Kulwinder Rodriguez MD          [place] [facility]

## 2019-07-09 PROBLEM — E66.01 SEVERE OBESITY (HCC): Status: ACTIVE | Noted: 2019-07-09

## 2019-09-17 PROBLEM — R73.03 PREDIABETES: Status: ACTIVE | Noted: 2019-09-17

## 2019-09-17 PROBLEM — E66.9 OBESITY: Status: ACTIVE | Noted: 2019-07-09

## 2019-09-30 ENCOUNTER — HEALTH MAINTENANCE LETTER (OUTPATIENT)
Age: 74
End: 2019-09-30

## 2020-03-15 ENCOUNTER — HEALTH MAINTENANCE LETTER (OUTPATIENT)
Age: 75
End: 2020-03-15

## 2021-01-15 ENCOUNTER — HEALTH MAINTENANCE LETTER (OUTPATIENT)
Age: 76
End: 2021-01-15

## 2021-02-12 ENCOUNTER — TRANSCRIBE ORDER (OUTPATIENT)
Dept: SCHEDULING | Age: 76
End: 2021-02-12

## 2021-02-12 DIAGNOSIS — M85.89 OSTEOPENIA OF MULTIPLE SITES: ICD-10-CM

## 2021-02-12 DIAGNOSIS — Z78.0 MENOPAUSE: Primary | ICD-10-CM

## 2021-03-04 ENCOUNTER — HOSPITAL ENCOUNTER (OUTPATIENT)
Dept: MAMMOGRAPHY | Age: 76
Discharge: HOME | End: 2021-03-04
Attending: INTERNAL MEDICINE
Payer: MEDICARE

## 2021-03-04 DIAGNOSIS — Z78.0 MENOPAUSE: ICD-10-CM

## 2021-03-04 DIAGNOSIS — M85.89 OSTEOPENIA OF MULTIPLE SITES: ICD-10-CM

## 2021-03-04 PROCEDURE — 77080 DXA BONE DENSITY AXIAL: CPT

## 2021-05-09 ENCOUNTER — HEALTH MAINTENANCE LETTER (OUTPATIENT)
Age: 76
End: 2021-05-09

## 2021-08-03 PROBLEM — E66.9 OBESITY: Status: RESOLVED | Noted: 2019-07-09 | Resolved: 2021-08-03

## 2021-10-24 ENCOUNTER — HEALTH MAINTENANCE LETTER (OUTPATIENT)
Age: 76
End: 2021-10-24

## 2022-02-13 ENCOUNTER — HEALTH MAINTENANCE LETTER (OUTPATIENT)
Age: 77
End: 2022-02-13

## 2022-03-18 PROBLEM — E66.9 OBESITY (BMI 30-39.9): Status: ACTIVE | Noted: 2018-10-18

## 2022-03-18 PROBLEM — R73.03 PREDIABETES: Status: ACTIVE | Noted: 2019-09-17

## 2022-03-18 PROBLEM — R73.09 ELEVATED GLUCOSE: Status: ACTIVE | Noted: 2017-09-28

## 2022-03-19 PROBLEM — R30.0 DYSURIA: Status: ACTIVE | Noted: 2018-09-14

## 2022-03-19 PROBLEM — K29.60 EROSIVE GASTRITIS: Status: ACTIVE | Noted: 2017-11-30

## 2022-03-19 PROBLEM — F32.A ANXIETY AND DEPRESSION: Status: ACTIVE | Noted: 2018-04-02

## 2022-03-19 PROBLEM — F41.9 ANXIETY AND DEPRESSION: Status: ACTIVE | Noted: 2018-04-02

## 2022-03-19 PROBLEM — K29.80 DUODENITIS: Status: ACTIVE | Noted: 2017-11-30

## 2022-03-19 PROBLEM — K43.9 VENTRAL HERNIA WITHOUT OBSTRUCTION OR GANGRENE: Status: ACTIVE | Noted: 2017-02-28

## 2022-03-20 PROBLEM — E03.9 HYPOTHYROIDISM: Status: ACTIVE | Noted: 2017-09-28

## 2022-05-19 ENCOUNTER — APPOINTMENT (OUTPATIENT)
Dept: CT IMAGING | Facility: CLINIC | Age: 77
End: 2022-05-19
Attending: EMERGENCY MEDICINE
Payer: COMMERCIAL

## 2022-05-19 ENCOUNTER — HOSPITAL ENCOUNTER (EMERGENCY)
Facility: CLINIC | Age: 77
Discharge: HOME | End: 2022-05-19
Attending: EMERGENCY MEDICINE | Admitting: EMERGENCY MEDICINE
Payer: COMMERCIAL

## 2022-05-19 ENCOUNTER — APPOINTMENT (OUTPATIENT)
Dept: GENERAL RADIOLOGY | Facility: CLINIC | Age: 77
End: 2022-05-19
Attending: EMERGENCY MEDICINE
Payer: COMMERCIAL

## 2022-05-19 VITALS
HEART RATE: 61 BPM | OXYGEN SATURATION: 95 % | DIASTOLIC BLOOD PRESSURE: 79 MMHG | HEIGHT: 60 IN | TEMPERATURE: 98 F | SYSTOLIC BLOOD PRESSURE: 129 MMHG | RESPIRATION RATE: 13 BRPM | BODY MASS INDEX: 37.3 KG/M2 | WEIGHT: 190 LBS

## 2022-05-19 DIAGNOSIS — T81.321A ABDOMINAL WOUND DEHISCENCE, INITIAL ENCOUNTER: ICD-10-CM

## 2022-05-19 DIAGNOSIS — N39.0 URINARY TRACT INFECTION WITHOUT HEMATURIA, SITE UNSPECIFIED: ICD-10-CM

## 2022-05-19 DIAGNOSIS — R06.00 DYSPNEA, UNSPECIFIED TYPE: ICD-10-CM

## 2022-05-19 LAB
ALBUMIN SERPL-MCNC: 3.6 G/DL (ref 3.4–5)
ALBUMIN UR-MCNC: NEGATIVE MG/DL
ALP SERPL-CCNC: 77 U/L (ref 40–150)
ALT SERPL W P-5'-P-CCNC: 35 U/L (ref 0–50)
ANION GAP SERPL CALCULATED.3IONS-SCNC: 9 MMOL/L (ref 3–14)
APPEARANCE UR: CLEAR
AST SERPL W P-5'-P-CCNC: 35 U/L (ref 0–45)
ATRIAL RATE - MUSE: 61 BPM
BACTERIA #/AREA URNS HPF: ABNORMAL /HPF
BASOPHILS # BLD AUTO: 0.1 10E3/UL (ref 0–0.2)
BASOPHILS NFR BLD AUTO: 1 %
BILIRUB SERPL-MCNC: 0.7 MG/DL (ref 0.2–1.3)
BILIRUB UR QL STRIP: NEGATIVE
BUN SERPL-MCNC: 14 MG/DL (ref 7–30)
CALCIUM SERPL-MCNC: 9.3 MG/DL (ref 8.5–10.1)
CHLORIDE BLD-SCNC: 102 MMOL/L (ref 94–109)
CO2 SERPL-SCNC: 26 MMOL/L (ref 20–32)
COLOR UR AUTO: YELLOW
CREAT SERPL-MCNC: 0.8 MG/DL (ref 0.52–1.04)
D DIMER PPP FEU-MCNC: 0.64 UG/ML FEU (ref 0–0.5)
DIASTOLIC BLOOD PRESSURE - MUSE: NORMAL MMHG
EOSINOPHIL # BLD AUTO: 0.2 10E3/UL (ref 0–0.7)
EOSINOPHIL NFR BLD AUTO: 2 %
ERYTHROCYTE [DISTWIDTH] IN BLOOD BY AUTOMATED COUNT: 13 % (ref 10–15)
GFR SERPL CREATININE-BSD FRML MDRD: 77 ML/MIN/1.73M2
GLUCOSE BLD-MCNC: 102 MG/DL (ref 70–99)
GLUCOSE UR STRIP-MCNC: NEGATIVE MG/DL
HCT VFR BLD AUTO: 40.6 % (ref 35–47)
HGB BLD-MCNC: 13.7 G/DL (ref 11.7–15.7)
HGB UR QL STRIP: NEGATIVE
HYALINE CASTS: 3 /LPF
IMM GRANULOCYTES # BLD: 0 10E3/UL
IMM GRANULOCYTES NFR BLD: 0 %
INTERPRETATION ECG - MUSE: NORMAL
KETONES UR STRIP-MCNC: NEGATIVE MG/DL
LEUKOCYTE ESTERASE UR QL STRIP: NEGATIVE
LYMPHOCYTES # BLD AUTO: 1.4 10E3/UL (ref 0.8–5.3)
LYMPHOCYTES NFR BLD AUTO: 20 %
MCH RBC QN AUTO: 31.6 PG (ref 26.5–33)
MCHC RBC AUTO-ENTMCNC: 33.7 G/DL (ref 31.5–36.5)
MCV RBC AUTO: 94 FL (ref 78–100)
MONOCYTES # BLD AUTO: 0.6 10E3/UL (ref 0–1.3)
MONOCYTES NFR BLD AUTO: 8 %
NEUTROPHILS # BLD AUTO: 4.9 10E3/UL (ref 1.6–8.3)
NEUTROPHILS NFR BLD AUTO: 69 %
NITRATE UR QL: POSITIVE
NRBC # BLD AUTO: 0 10E3/UL
NRBC BLD AUTO-RTO: 0 /100
P AXIS - MUSE: 67 DEGREES
PH UR STRIP: 6.5 [PH] (ref 5–7)
PLATELET # BLD AUTO: 232 10E3/UL (ref 150–450)
POTASSIUM BLD-SCNC: 3.5 MMOL/L (ref 3.4–5.3)
PR INTERVAL - MUSE: 158 MS
PROT SERPL-MCNC: 7.4 G/DL (ref 6.8–8.8)
QRS DURATION - MUSE: 78 MS
QT - MUSE: 346 MS
QTC - MUSE: 348 MS
R AXIS - MUSE: 9 DEGREES
RBC # BLD AUTO: 4.34 10E6/UL (ref 3.8–5.2)
RBC URINE: 2 /HPF
SODIUM SERPL-SCNC: 137 MMOL/L (ref 133–144)
SP GR UR STRIP: 1.02 (ref 1–1.03)
SQUAMOUS EPITHELIAL: 3 /HPF
SYSTOLIC BLOOD PRESSURE - MUSE: NORMAL MMHG
T AXIS - MUSE: 61 DEGREES
TROPONIN I SERPL HS-MCNC: 4 NG/L
UROBILINOGEN UR STRIP-MCNC: NORMAL MG/DL
VENTRICULAR RATE- MUSE: 61 BPM
WBC # BLD AUTO: 7.1 10E3/UL (ref 4–11)
WBC URINE: 4 /HPF

## 2022-05-19 PROCEDURE — 80053 COMPREHEN METABOLIC PANEL: CPT | Performed by: EMERGENCY MEDICINE

## 2022-05-19 PROCEDURE — 71275 CT ANGIOGRAPHY CHEST: CPT

## 2022-05-19 PROCEDURE — 84484 ASSAY OF TROPONIN QUANT: CPT | Performed by: EMERGENCY MEDICINE

## 2022-05-19 PROCEDURE — 71046 X-RAY EXAM CHEST 2 VIEWS: CPT

## 2022-05-19 PROCEDURE — 96360 HYDRATION IV INFUSION INIT: CPT | Mod: 59

## 2022-05-19 PROCEDURE — 99285 EMERGENCY DEPT VISIT HI MDM: CPT | Mod: 25

## 2022-05-19 PROCEDURE — 96361 HYDRATE IV INFUSION ADD-ON: CPT

## 2022-05-19 PROCEDURE — 82040 ASSAY OF SERUM ALBUMIN: CPT | Performed by: EMERGENCY MEDICINE

## 2022-05-19 PROCEDURE — 81001 URINALYSIS AUTO W/SCOPE: CPT | Performed by: EMERGENCY MEDICINE

## 2022-05-19 PROCEDURE — 250N000009 HC RX 250: Performed by: EMERGENCY MEDICINE

## 2022-05-19 PROCEDURE — 93005 ELECTROCARDIOGRAM TRACING: CPT

## 2022-05-19 PROCEDURE — 250N000011 HC RX IP 250 OP 636: Performed by: EMERGENCY MEDICINE

## 2022-05-19 PROCEDURE — 258N000003 HC RX IP 258 OP 636: Performed by: EMERGENCY MEDICINE

## 2022-05-19 PROCEDURE — 85004 AUTOMATED DIFF WBC COUNT: CPT | Performed by: EMERGENCY MEDICINE

## 2022-05-19 PROCEDURE — 250N000013 HC RX MED GY IP 250 OP 250 PS 637: Performed by: EMERGENCY MEDICINE

## 2022-05-19 PROCEDURE — 87086 URINE CULTURE/COLONY COUNT: CPT | Performed by: EMERGENCY MEDICINE

## 2022-05-19 PROCEDURE — 36415 COLL VENOUS BLD VENIPUNCTURE: CPT | Performed by: EMERGENCY MEDICINE

## 2022-05-19 PROCEDURE — 85379 FIBRIN DEGRADATION QUANT: CPT | Performed by: EMERGENCY MEDICINE

## 2022-05-19 RX ORDER — ACETAMINOPHEN 500 MG
1000 TABLET ORAL ONCE
Status: COMPLETED | OUTPATIENT
Start: 2022-05-19 | End: 2022-05-19

## 2022-05-19 RX ORDER — IOPAMIDOL 755 MG/ML
70 INJECTION, SOLUTION INTRAVASCULAR ONCE
Status: COMPLETED | OUTPATIENT
Start: 2022-05-19 | End: 2022-05-19

## 2022-05-19 RX ORDER — CIPROFLOXACIN 500 MG/1
500 TABLET, FILM COATED ORAL 2 TIMES DAILY
Qty: 10 TABLET | Refills: 0 | Status: SHIPPED | OUTPATIENT
Start: 2022-05-19 | End: 2022-05-24

## 2022-05-19 RX ADMIN — SODIUM CHLORIDE 1000 ML: 9 INJECTION, SOLUTION INTRAVENOUS at 12:08

## 2022-05-19 RX ADMIN — SODIUM CHLORIDE 94 ML: 900 INJECTION INTRAVENOUS at 13:32

## 2022-05-19 RX ADMIN — ACETAMINOPHEN 1000 MG: 500 TABLET, FILM COATED ORAL at 12:16

## 2022-05-19 RX ADMIN — IOPAMIDOL 70 ML: 755 INJECTION, SOLUTION INTRAVENOUS at 13:32

## 2022-05-19 ASSESSMENT — ENCOUNTER SYMPTOMS
WOUND: 1
FEVER: 0
DIZZINESS: 1
SHORTNESS OF BREATH: 1
LIGHT-HEADEDNESS: 1
FATIGUE: 1

## 2022-05-19 NOTE — ED TRIAGE NOTES
Pt just moved back from [place] 3 wks ago. For the past 5 wks has been dizzy, feeling like she is going to pass out. Having sob. Pt concerned for wound to lt groin. Pt was at [facility] today and sent here for further evaluation     Triage Assessment     Row Name 05/19/22 1056       Triage Assessment (Adult)    Airway WDL WDL       Respiratory WDL    Respiratory WDL rhythm/pattern    Rhythm/Pattern, Respiratory shortness of breath       Cardiac WDL    Cardiac WDL WDL       Cognitive/Neuro/Behavioral WDL    Cognitive/Neuro/Behavioral WDL WDL

## 2022-05-19 NOTE — ED PROVIDER NOTES
History   Chief Complaint:  Shortness of Breath, Wound Check, and Syncope       The history is provided by the patient.      Shyla Valentino is a 76 year old female with history of hypertension, hyperlipidemia, and type II diabetes who presents with the swelling of a lymph node, an opening wound on her side, lightheadedness, dizziness, fatigue, and shortness of breath. The patient moved back to [place] from [place] a few weeks ago after living in [place] in 6 years. The patient visited Dr. Rausch at the [facility], who found both on opening wound on her side and an infected lymph node in her left groin area. The OBGYN doctor was unable to explain the swollen lymph node, thus the patient presented to the ED today. The patient was unable to locate the problematic area today which the OBGYN doctor was concerned about. The patient denies any fever or any known illness in the people around her. As for her other symptoms, the lightheadedness, dizziness, fatigue, and shortness of breath have been intermittently bothering her for the past 5 weeks    CT Abdomen Pelvis w/ Contrast from 4/19/2022:  1. No evidence to suggest bowel obstruction, free air/fluid or abscess.  2. No hydronephrosis or obstructive urinary tract calculi. Tiny hypodensities in the kidneys are too small to characterize but likely benign such as tiny cysts.  3. There is evidence for prior anterior upper abdominal hernia repair with mesh segment in place. Anterior bulging of the abdominal wall is noted in the abdomen and upper pelvis. Some asymmetric soft tissue thickening is seen in the anterior pelvic wall on axial image 100 and adjacent slices in the region of the umbilicus. There is slight induration in the adjacent subcutaneous fat without definitive recurrent hernia or abscess.  4. Uterus appears intact. Previous bilateral ovarian resection with clips at the adnexal regions. No free fluid or pelvic abscess.  5. Mild colonic  diverticulosis without diverticulitis. Appendix is of normal caliber.  6. Degenerative changes mid to lower lumbar disc levels.  7. No other acute finding-see above for all detail.    Review of Systems   Constitutional: Positive for fatigue. Negative for fever.   Respiratory: Positive for shortness of breath.    Skin: Positive for wound.   Neurological: Positive for dizziness and light-headedness.   All other systems reviewed and are negative.      Allergies:  Pollen extract   Propoxyphene    Medications:  Lipitor  Vitamin D  Cholecalciferol   Estrace  Flunisolide  Flonase  Lactobacillus extra  Synthroid  Prinzide  Claritin  Mag-ox  Glucophage  Prilosec  Paxil  Senokot  Ambien  Norvasc  Ultram  Lipitor  Protonix  Zestoretic  Paxil    Past Medical History:     Hypertension  GERD  Blood transfusion  Hypothyroidism  Hyperlipidemia  Depression  Obese  Osteopenia  Breast cancer  Total knee replacement  Type II diabetes  Meningioma  Osteoarthritis  Ventral hernia  Spinal stenosis  Seasonal allergies  Erosive gastritis  Duodenitis  Anxiety  Ataxia    Past Surgical History:    Appendectomy  Arthroplasty revision knee   section  Colonoscopy  Oophorectomy  Bilateral breast reduction  Mastectomy bilateral  ORIF wrist   EGD combined  Cataract extraction    Family History:    CAD  Alzheimer disease  Hypertension  GI disease  Blood disease  Diabetes  Cerebrovascular disease  Lipids  Ulcerative colitis  MI  Heart disease  Dementia  Seizures  Thrombocytopenia    Social History:  Patient came from home.  Patient is unaccompanied in the ED.    Physical Exam     Patient Vitals for the past 24 hrs:   BP Temp Temp src Pulse Resp SpO2 Height Weight   22 1315 -- -- -- 61 13 95 % -- --   22 1300 -- -- -- 61 13 95 % -- --   22 1215 -- -- -- 58 20 98 % -- --   22 1200 -- -- -- 61 10 98 % -- --   22 1150 129/79 98  F (36.7  C) Oral 68 -- 98 % -- --   22 1041 101/73 98.1  F (36.7  C) Temporal 69 16  97 % 1.524 m (5') 86.2 kg (190 lb)       Physical Exam  General: Alert, No distress. Nontoxic appearance  Head: No signs of trauma.   Mouth/Throat: Oropharynx moist.   Eyes: Conjunctivae are normal. Pupils are equal..   Neck: Normal range of motion.    CV: Appears well perfused.  Resp:No respiratory distress.   MSK: Normal range of motion. No obvious deformity.   Neuro: The patient is alert and interactive. FONTANEZ. Speech normal. GCS 15  Skin: No lesion or sign of trauma noted.   Psych: normal mood and affect. behavior is normal.       Emergency Department Course     ECG  ECG results from 05/19/22   EKG 12-lead, tracing only     Value    Systolic Blood Pressure     Diastolic Blood Pressure     Ventricular Rate 61    Atrial Rate 61    AZ Interval 158    QRS Duration 78        QTc 348    P Axis 67    R AXIS 9    T Axis 61    Interpretation ECG      Sinus rhythm  Nonspecific T wave abnormality  Abnormal ECG  No previous ECGs available  Confirmed by GENERATED REPORT, COMPUTER ([id number]),  Lisa Burgess ([id number]) on 5/19/2022 11:04:32 AM         Imaging:  XR Chest 2 Views   Preliminary Result   IMPRESSION: Aortic calcification. Surgical clips projected over the   bilateral axillary regions and bilateral breasts. Chest otherwise   negative. Lungs clear. Heart size and pulmonary vascularity are within   normal limits.      CT Chest Pulmonary Embolism w Contrast    (Results Pending)     Report per radiology    Laboratory:  Labs Ordered and Resulted from Time of ED Arrival to Time of ED Departure   COMPREHENSIVE METABOLIC PANEL - Abnormal       Result Value    Sodium 137      Potassium 3.5      Chloride 102      Carbon Dioxide (CO2) 26      Anion Gap 9      Urea Nitrogen 14      Creatinine 0.80      Calcium 9.3      Glucose 102 (*)     Alkaline Phosphatase 77      AST 35      ALT 35      Protein Total 7.4      Albumin 3.6      Bilirubin Total 0.7      GFR Estimate 77     D DIMER QUANTITATIVE - Abnormal    D-Dimer Quantitative  0.64 (*)    ROUTINE UA WITH MICROSCOPIC REFLEX TO CULTURE - Abnormal    Color Urine Yellow      Appearance Urine Clear      Glucose Urine Negative      Bilirubin Urine Negative      Ketones Urine Negative      Specific Gravity Urine 1.017      Blood Urine Negative      pH Urine 6.5      Protein Albumin Urine Negative      Urobilinogen Urine Normal      Nitrite Urine Positive (*)     Leukocyte Esterase Urine Negative      Bacteria Urine Few (*)     RBC Urine 2      WBC Urine 4      Squamous Epithelials Urine 3 (*)     Hyaline Casts Urine 3 (*)    TROPONIN I - Normal    Troponin I High Sensitivity 4     CBC WITH PLATELETS AND DIFFERENTIAL    WBC Count 7.1      RBC Count 4.34      Hemoglobin 13.7      Hematocrit 40.6      MCV 94      MCH 31.6      MCHC 33.7      RDW 13.0      Platelet Count 232      % Neutrophils 69      % Lymphocytes 20      % Monocytes 8      % Eosinophils 2      % Basophils 1      % Immature Granulocytes 0      NRBCs per 100 WBC 0      Absolute Neutrophils 4.9      Absolute Lymphocytes 1.4      Absolute Monocytes 0.6      Absolute Eosinophils 0.2      Absolute Basophils 0.1      Absolute Immature Granulocytes 0.0      Absolute NRBCs 0.0     URINE CULTURE     Emergency Department Course:       Reviewed:  I reviewed nursing notes, vitals, past medical history and Care Everywhere    Assessments:  1152 I obtained history and examined the patient as noted above.     Interventions:  1216 Tylenol 1,000 mg PO    Disposition:  Care of the patient was transferred to my colleague Dr. Centeno pending CT.       Impression & Plan     CMS Diagnoses: None    Medical Decision Making:  This patient is presenting to the ER with complaints of weakness, shortness of breath, and near syncope  The differential considered for the dyspnea included CHF, PE, ACS, pneumonia, pneumothorax, medication induced pulmonary toxicity, ARDS, metabolic acidosis, airflow obstruction (asthma, COPD, upper airway obstruction), restrictive  lung disease (interstitial lung disease, pleural thickening or effusion, respiratory muscle weakness, obesity), aspiration, cardiac arrhythmia, cardiac tamponade, hypercapnia, sepsis, and anemia.  The etiology of these symptoms is unclear.  The patient is not hypoxic nor tachypneic.  The patient is not working hard to breathe.  The workup in the ED is except for evidence of a urinary tract infection.  Based on previous cultures, she will be started on Cipro we will await the culture results.  The patient's chronic wound dehiscence of her lower abdominal surgical incision need to be followed through the wound clinic.  She was given a referral.  The patient will continue the workup as an outpatient or return if symptoms worsen.      Diagnosis:    ICD-10-CM    1. Dyspnea, unspecified type  R06.00    2. Urinary tract infection without hematuria, site unspecified  N39.0    3. Abdominal wound dehiscence, initial encounter  T81.30XA         START taking      Dose / Directions   ciprofloxacin 500 MG tablet  Commonly known as: CIPRO      Dose: 500 mg  Take 1 tablet (500 mg) by mouth 2 times daily for 5 days  Quantity: 10 tablet  Refills: 0           Where to get your medicines      Some of these will need a paper prescription and others can be bought over the counter. Ask your nurse if you have questions.    Bring a paper prescription for each of these medications    ciprofloxacin 500 MG tablet       Scribe Disclosure:  Mayo MCKINLEY Ismail, am serving as a scribe at 11:52 AM on 5/19/2022 to document services personally performed by Alcon Major MD based on my observations and the provider's statements to me.     Hans MCKINLEY, am serving as a scribe at 12:17 PM on 5/19/2022 to document services personally performed by Alcon Major MD based on my observations and the provider's statements to me.       Alcon Major MD  05/19/22 1617

## 2022-05-21 LAB — BACTERIA UR CULT: ABNORMAL

## 2022-05-26 ENCOUNTER — TELEPHONE (OUTPATIENT)
Dept: WOUND CARE | Facility: CLINIC | Age: 77
End: 2022-05-26

## 2022-05-26 NOTE — TELEPHONE ENCOUNTER
Discussed [employer] insurance with Korina PABON. She states [place] does not accept the patient's current plan. Returned call to patient to discuss. She was unhappy that she was seen at a [place] ER and they told her to call [facility] for an appointment. Number given to other wound clinics in the [facility]. The patient does not have a current PCP.

## 2022-05-26 NOTE — TELEPHONE ENCOUNTER
[facility] Wound    Who is the name of the provider?:  New      What is the location you see this provider at?: [place]    Reason for call:  Advised by gyn provider to see wound specialist for an old abdomenal surgical scar, 2008, has an small, draining, open spot, no sign of infection.         Can we leave a detailed message on this number?  YES

## 2022-06-05 ENCOUNTER — HEALTH MAINTENANCE LETTER (OUTPATIENT)
Age: 77
End: 2022-06-05

## 2022-10-15 ENCOUNTER — HEALTH MAINTENANCE LETTER (OUTPATIENT)
Age: 77
End: 2022-10-15

## 2023-03-26 ENCOUNTER — HEALTH MAINTENANCE LETTER (OUTPATIENT)
Age: 78
End: 2023-03-26

## 2023-06-11 ENCOUNTER — HEALTH MAINTENANCE LETTER (OUTPATIENT)
Age: 78
End: 2023-06-11

## 2024-05-26 ENCOUNTER — HEALTH MAINTENANCE LETTER (OUTPATIENT)
Age: 79
End: 2024-05-26

## 2024-08-04 ENCOUNTER — HEALTH MAINTENANCE LETTER (OUTPATIENT)
Age: 79
End: 2024-08-04

## 2025-06-14 ENCOUNTER — HEALTH MAINTENANCE LETTER (OUTPATIENT)
Age: 80
End: 2025-06-14

## 2025-06-30 ENCOUNTER — TRANSCRIBE ORDERS (OUTPATIENT)
Dept: OTHER | Age: 80
End: 2025-06-30

## 2025-06-30 DIAGNOSIS — I35.0 SEVERE AORTIC STENOSIS: Primary | ICD-10-CM

## 2025-07-07 ENCOUNTER — TRANSFERRED RECORDS (OUTPATIENT)
Dept: ADMINISTRATIVE | Facility: CLINIC | Age: 80
End: 2025-07-07

## 2025-07-08 NOTE — PROGRESS NOTES
_________________________________________________________________________________________________    [address] [address]  [place] [address]  [phone]      Patient:            Shyla Valentino   YOB: 1945  Date:                    7/7/2025  Historian:    self  Visit Type:              Office Visit   Rendering Provider:  Shanna GRUBER    History of Present Illness:    This is a 79-year-old female with past medical history significant for aortic stenosis, s/p TAVR 2 weeks ago, who is seen as an established patient in follow-up today regarding heartburn, regurgitation, and irritation.    In review, Shyla was referred for EGD on 5/21/2025, which was notable for Schatzki's ring, s/p dilation with an 18 mm balloon dilator.  Also noted with small hiatal hernia.  Gastritis observed, gastric biopsy revealed erosive reactive gastropathy.  Proximal and distal esophageal biopsy normal.    In review, Shyla reports longstanding history of belching with intermittent associated regurgitation.  She notes infrequent heartburn, noting her primary bothersome symptom again has been frequent belching.  She has trialed intermittent over-the-counter medications for heartburn in the past including Tums without significant improvement.  More recently, she has been taking pantoprazole 40 mg once daily though has not noted significant improvement with this.  She also notes occasional dysphagia to solids, noting this does not happen frequently and when symptomatic she drinks extra water to help push the food bolus downward.  No specific dysphagia to liquids.  She otherwise denies associated abdominal pain, unintentional weight loss, bowel habit changes, melena, hematochezia, other new symptoms.    She is a non-smoker.  She drinks 2 3 alcoholic beverages 3 4 times weekly.  No regular NSAID use.    Assessment/Plan  # Detail Type Description   1. Assessment Reactive gastropathy (K31.89).   2. Assessment Heartburn (R12).    3. Assessment Eructation (R14.2).     Detail Type Description   Impression This is a very pleasant 79-year-old female who is seen as an established patient in follow-up today regarding eructation, intermittent heartburn and regurgitation in the setting of recently identified reactive gastropathy during EGD.    As above, no specific associated abdominal discomfort, no recent heartburn.  At this time, she feels her most bothersome symptom has been frequent belching with occasional regurgitation.  She rarely experiences dysphagia to solids, and denies history of dysphagia to liquids.  EGD notable for reactive gastropathy as well as small hiatal hernia and Schatzki's ring s/p dilation.  We did discuss option for further evaluation of swallowing function including esophagram, high-resolution manometry to assess for underlying motility disorders contributing to symptoms, regurgitation though she declines at this time and would prefer to start with optimizing medication management.  Therefore, we will have her increase pantoprazole to 40 mg twice daily.  She will call in 2 weeks with a symptom update.  If no significant improvement, would plan to add sucralfate given findings of reactive gastropathy.  She is encouraged to avoid NSAIDs, and minimize alcohol intake.  She will return to clinic in 8 weeks for reevaluation.  If no significant improvement, ongoing regurgitation, notable dysphagia, etc. would consider additional swallowing evaluations as above at that time.     Detail Type Description   Patient Plan It was very nice meeting with you again today!    -Increase pantoprazole to 40 mg twice daily  -Call in 2 weeks with a symptom update. If no significant improvement, will plan to start sucralfate.  -Return to clinic in 8 weeks  -Call with questions or concerns [phone] ext [phone]    General reflux recommendations:   -Avoid tomato based foods, greasy foods, spicy foods, citrus foods, chocolate, caffeine, acidic  substances to help improve symptoms  -Avoid eating 2-3 hours prior to bed  -Elevate the head of your bed to help with symptoms at night         cc:  Leann Osuna DO  cc:       Electronically Signed by:  Shanna GRUBER  07/07/2025 02:27 PM      Medications:  Medication Dose Sig Description Comments   bupropion HCl  mg tablet,12 hr sustained-release 150 mg     cephalexin 250 mg capsule 250 mg TAKE 1 CAPSULE BY MOUTH EVERY DAY    fluticasone propionate 50 mcg/actuation nasal spray,suspension 50 mcg/actuation     Levothroid 75 mcg tablet 75 mcg take 1 tablet by oral route  every day    lisinopril 20 mg-hydrochlorothiazide 12.5 mg tablet 20 mg-12.5 mg take 1 tablet by oral route  every day    pantoprazole 40 mg tablet,delayed release 40 mg Take 1 tablet by mouth twice daily    paroxetine 40 mg tablet 40 mg take 1 tablet by ORAL route  every morning    simvastatin 40 mg tablet 40 mg take 1 tablet by oral route  every day in the evening    Vazalore 81 mg capsule 81 mg take 1 capsule by oral route  every day      Allergies:  Medication Name Ingredient Reaction Comment    NO KNOWN DRUG ALLERGIES       Vitals:  BP mm/Hg Pulse/min Resp/min Temp F Height (Total in.) Weight (lbs.) Weight (oz.) BMI   153/68 69   60.00 188.20  36.75     Smoking Status:    Use Status Type Smoking Status Usage Per Day Years Used Total Pack Years   yes  Former smoker        Race:    Ethnicity:  Not  or   Preferred Language:  English

## (undated) DEVICE — [HIGH FLOW INSUFFLATOR,  DO NOT USE IF PACKAGE IS DAMAGED,  KEEP DRY,  KEEP AWAY FROM SUNLIGHT,  PROTECT FROM HEAT AND RADIOACTIVE SOURCES.]: Brand: PNEUMOSURE

## (undated) DEVICE — FENESTRATED BIPOLAR FORCEPS: Brand: ENDOWRIST;DAVINCI SI

## (undated) DEVICE — DRAPE INSTR ARM ROBOTIC ENDOWRIST DA VINCI S

## (undated) DEVICE — COVER,TABLE,44X76,STERILE: Brand: MEDLINE

## (undated) DEVICE — SKIN MARKER,REGULAR TIP WITH RULER AND LABELS: Brand: DEVON

## (undated) DEVICE — OBTRTR BLDELSS 8MM DISP -- DA VINCI - SNGL USE

## (undated) DEVICE — ELECTRO LUBE IS A SINGLE PATIENT USE DEVICE THAT IS INTENDED TO BE USED ON ELECTROSURGICAL ELECTRODES TO REDUCE STICKING.: Brand: KEY SURGICAL ELECTRO LUBE

## (undated) DEVICE — SUTURE PDS II SZ 0 L27IN ABSRB VLT L26MM CT-2 1/2 CIR Z334H

## (undated) DEVICE — MEGASUTURECUT ND: Brand: ENDOWRIST;DAVINCI SI

## (undated) DEVICE — KENDALL SCD EXPRESS SLEEVES, KNEE LENGTH, MEDIUM: Brand: KENDALL SCD

## (undated) DEVICE — DRAPE ROBOTIC CAM HD DISP ENDOWRIST DA VINCI SI

## (undated) DEVICE — VISUALIZATION SYSTEM: Brand: CLEARIFY

## (undated) DEVICE — CANNULA SEAL

## (undated) DEVICE — DRAPE TWL SURG 16X26IN BLU ORB04] ALLCARE INC]

## (undated) DEVICE — GOWN,REINF,POLY,ECL,PP SLV,XL: Brand: MEDLINE

## (undated) DEVICE — DRAPE ROBOTIC CAM ARM DISP ENDOWRIST DA VINCI SI

## (undated) DEVICE — SUTURE MCRYL SZ 4-0 L27IN ABSRB UD L19MM PS-2 1/2 CIR PRIM Y426H

## (undated) DEVICE — PREP CHLORAPREP 10.5 ML ORG --

## (undated) DEVICE — LAP CHOLE: Brand: MEDLINE INDUSTRIES, INC.

## (undated) DEVICE — DERMABOND SKIN ADH 0.7ML -- DERMABOND ADVANCED 12/BX

## (undated) DEVICE — BLADELESS OPTICAL TROCAR WITH FIXATION CANNULA: Brand: VERSAONE

## (undated) DEVICE — TIP COVER ACCESSORY

## (undated) DEVICE — MONOPOLAR CURVED SCISSORS: Brand: ENDOWRIST

## (undated) DEVICE — WARMER SCP STRL DISP

## (undated) DEVICE — SUTURE V-LOC 180 SZ 0 L12IN ABSRB GRN L37MM GS-21 1/2 CIR VLOCL0316

## (undated) DEVICE — BIPOLAR FORCEPS CORD,BANANA LEADS: Brand: VALLEYLAB

## (undated) DEVICE — CARDINAL HEALTH FLEXIBLE LIGHT HANDLE COVER: Brand: CARDINAL HEALTH

## (undated) DEVICE — INTENDED FOR TISSUE SEPARATION, AND OTHER PROCEDURES THAT REQUIRE A SHARP SURGICAL BLADE TO PUNCTURE OR CUT.: Brand: BARD-PARKER ® STAINLESS STEEL BLADES

## (undated) DEVICE — SUTURE SZ 0 27IN 5/8 CIR UR-6  TAPER PT VIOLET ABSRB VICRYL J603H

## (undated) DEVICE — TROCARS: Brand: KII® BALLOON BLUNT TIP SYSTEM

## (undated) DEVICE — APPLICATOR FBR TIP L6IN COT TIP WOOD SHFT SWAB 2000 PER CA

## (undated) DEVICE — CADIERE FORCEPS: Brand: ENDOWRIST;DAVINCI SI

## (undated) DEVICE — NEEDLE HYPO 25GA L1.5IN BLU POLYPR HUB S STL REG BVL STR

## (undated) DEVICE — TRAY CATH 16F DRN BG LTX -- CONVERT TO ITEM 363158

## (undated) DEVICE — TISSUE RETRIEVAL SYSTEM: Brand: INZII RETRIEVAL SYSTEM

## (undated) DEVICE — SUTURE V LOC 1 L18IN NONABSORBABLE GS-21 TAPERPOINT NDL VLOCN0327

## (undated) DEVICE — REM POLYHESIVE ADULT PATIENT RETURN ELECTRODE: Brand: VALLEYLAB